# Patient Record
Sex: FEMALE | Race: OTHER | NOT HISPANIC OR LATINO | URBAN - METROPOLITAN AREA
[De-identification: names, ages, dates, MRNs, and addresses within clinical notes are randomized per-mention and may not be internally consistent; named-entity substitution may affect disease eponyms.]

---

## 2021-05-13 ENCOUNTER — EMERGENCY (EMERGENCY)
Facility: HOSPITAL | Age: 46
LOS: 1 days | Discharge: HOME | End: 2021-05-13
Attending: EMERGENCY MEDICINE | Admitting: EMERGENCY MEDICINE
Payer: MEDICAID

## 2021-05-13 VITALS
DIASTOLIC BLOOD PRESSURE: 97 MMHG | HEIGHT: 64 IN | OXYGEN SATURATION: 100 % | RESPIRATION RATE: 20 BRPM | WEIGHT: 175.05 LBS | SYSTOLIC BLOOD PRESSURE: 153 MMHG | TEMPERATURE: 99 F | HEART RATE: 103 BPM

## 2021-05-13 VITALS
DIASTOLIC BLOOD PRESSURE: 69 MMHG | RESPIRATION RATE: 18 BRPM | TEMPERATURE: 98 F | SYSTOLIC BLOOD PRESSURE: 135 MMHG | HEART RATE: 100 BPM | OXYGEN SATURATION: 100 %

## 2021-05-13 DIAGNOSIS — J45.901 UNSPECIFIED ASTHMA WITH (ACUTE) EXACERBATION: ICD-10-CM

## 2021-05-13 DIAGNOSIS — R07.89 OTHER CHEST PAIN: ICD-10-CM

## 2021-05-13 DIAGNOSIS — Z20.822 CONTACT WITH AND (SUSPECTED) EXPOSURE TO COVID-19: ICD-10-CM

## 2021-05-13 DIAGNOSIS — R06.02 SHORTNESS OF BREATH: ICD-10-CM

## 2021-05-13 DIAGNOSIS — E11.9 TYPE 2 DIABETES MELLITUS WITHOUT COMPLICATIONS: ICD-10-CM

## 2021-05-13 LAB
ALBUMIN SERPL ELPH-MCNC: 4.3 G/DL — SIGNIFICANT CHANGE UP (ref 3.3–5)
ALP SERPL-CCNC: 107 U/L — SIGNIFICANT CHANGE UP (ref 40–120)
ALT FLD-CCNC: 10 U/L — SIGNIFICANT CHANGE UP (ref 10–45)
ANION GAP SERPL CALC-SCNC: 15 MMOL/L — SIGNIFICANT CHANGE UP (ref 5–17)
ANISOCYTOSIS BLD QL: SLIGHT — SIGNIFICANT CHANGE UP
APTT BLD: 28.5 SEC — SIGNIFICANT CHANGE UP (ref 27.5–35.5)
AST SERPL-CCNC: 16 U/L — SIGNIFICANT CHANGE UP (ref 10–40)
BASOPHILS # BLD AUTO: 0 K/UL — SIGNIFICANT CHANGE UP (ref 0–0.2)
BASOPHILS NFR BLD AUTO: 0 % — SIGNIFICANT CHANGE UP (ref 0–2)
BILIRUB SERPL-MCNC: 0.2 MG/DL — SIGNIFICANT CHANGE UP (ref 0.2–1.2)
BUN SERPL-MCNC: 11 MG/DL — SIGNIFICANT CHANGE UP (ref 7–23)
CALCIUM SERPL-MCNC: 9.6 MG/DL — SIGNIFICANT CHANGE UP (ref 8.4–10.5)
CHLORIDE SERPL-SCNC: 98 MMOL/L — SIGNIFICANT CHANGE UP (ref 96–108)
CO2 SERPL-SCNC: 21 MMOL/L — LOW (ref 22–31)
CREAT SERPL-MCNC: 0.59 MG/DL — SIGNIFICANT CHANGE UP (ref 0.5–1.3)
D DIMER BLD IA.RAPID-MCNC: 229 NG/ML DDU — SIGNIFICANT CHANGE UP
EOSINOPHIL # BLD AUTO: 0.37 K/UL — SIGNIFICANT CHANGE UP (ref 0–0.5)
EOSINOPHIL NFR BLD AUTO: 3.5 % — SIGNIFICANT CHANGE UP (ref 0–6)
GIANT PLATELETS BLD QL SMEAR: PRESENT — SIGNIFICANT CHANGE UP
GLUCOSE SERPL-MCNC: 213 MG/DL — HIGH (ref 70–99)
HCT VFR BLD CALC: 33.6 % — LOW (ref 34.5–45)
HGB BLD-MCNC: 10.4 G/DL — LOW (ref 11.5–15.5)
INR BLD: 1.02 — SIGNIFICANT CHANGE UP (ref 0.88–1.16)
LYMPHOCYTES # BLD AUTO: 28.7 % — SIGNIFICANT CHANGE UP (ref 13–44)
LYMPHOCYTES # BLD AUTO: 3.01 K/UL — SIGNIFICANT CHANGE UP (ref 1–3.3)
MANUAL SMEAR VERIFICATION: SIGNIFICANT CHANGE UP
MCHC RBC-ENTMCNC: 22.9 PG — LOW (ref 27–34)
MCHC RBC-ENTMCNC: 31 GM/DL — LOW (ref 32–36)
MCV RBC AUTO: 73.8 FL — LOW (ref 80–100)
MICROCYTES BLD QL: SLIGHT — SIGNIFICANT CHANGE UP
MONOCYTES # BLD AUTO: 0.45 K/UL — SIGNIFICANT CHANGE UP (ref 0–0.9)
MONOCYTES NFR BLD AUTO: 4.3 % — SIGNIFICANT CHANGE UP (ref 2–14)
NEUTROPHILS # BLD AUTO: 6.66 K/UL — SIGNIFICANT CHANGE UP (ref 1.8–7.4)
NEUTROPHILS NFR BLD AUTO: 63.5 % — SIGNIFICANT CHANGE UP (ref 43–77)
NT-PROBNP SERPL-SCNC: 17 PG/ML — SIGNIFICANT CHANGE UP (ref 0–300)
OVALOCYTES BLD QL SMEAR: SLIGHT — SIGNIFICANT CHANGE UP
PLAT MORPH BLD: ABNORMAL
PLATELET # BLD AUTO: 429 K/UL — HIGH (ref 150–400)
POLYCHROMASIA BLD QL SMEAR: SLIGHT — SIGNIFICANT CHANGE UP
POTASSIUM SERPL-MCNC: 4.3 MMOL/L — SIGNIFICANT CHANGE UP (ref 3.5–5.3)
POTASSIUM SERPL-SCNC: 4.3 MMOL/L — SIGNIFICANT CHANGE UP (ref 3.5–5.3)
PROT SERPL-MCNC: 8.2 G/DL — SIGNIFICANT CHANGE UP (ref 6–8.3)
PROTHROM AB SERPL-ACNC: 12.2 SEC — SIGNIFICANT CHANGE UP (ref 10.6–13.6)
RBC # BLD: 4.55 M/UL — SIGNIFICANT CHANGE UP (ref 3.8–5.2)
RBC # FLD: 16.9 % — HIGH (ref 10.3–14.5)
RBC BLD AUTO: ABNORMAL
SARS-COV-2 RNA SPEC QL NAA+PROBE: NEGATIVE — SIGNIFICANT CHANGE UP
SARS-COV-2 RNA SPEC QL NAA+PROBE: SIGNIFICANT CHANGE UP
SODIUM SERPL-SCNC: 134 MMOL/L — LOW (ref 135–145)
TROPONIN T SERPL-MCNC: 0.01 NG/ML — SIGNIFICANT CHANGE UP (ref 0–0.01)
WBC # BLD: 10.49 K/UL — SIGNIFICANT CHANGE UP (ref 3.8–10.5)
WBC # FLD AUTO: 10.49 K/UL — SIGNIFICANT CHANGE UP (ref 3.8–10.5)

## 2021-05-13 PROCEDURE — 87635 SARS-COV-2 COVID-19 AMP PRB: CPT

## 2021-05-13 PROCEDURE — U0003: CPT

## 2021-05-13 PROCEDURE — 85730 THROMBOPLASTIN TIME PARTIAL: CPT

## 2021-05-13 PROCEDURE — 94640 AIRWAY INHALATION TREATMENT: CPT

## 2021-05-13 PROCEDURE — 85025 COMPLETE CBC W/AUTO DIFF WBC: CPT

## 2021-05-13 PROCEDURE — 36415 COLL VENOUS BLD VENIPUNCTURE: CPT

## 2021-05-13 PROCEDURE — 85610 PROTHROMBIN TIME: CPT

## 2021-05-13 PROCEDURE — 99284 EMERGENCY DEPT VISIT MOD MDM: CPT | Mod: 25

## 2021-05-13 PROCEDURE — U0005: CPT

## 2021-05-13 PROCEDURE — 84484 ASSAY OF TROPONIN QUANT: CPT

## 2021-05-13 PROCEDURE — 99285 EMERGENCY DEPT VISIT HI MDM: CPT | Mod: 25

## 2021-05-13 PROCEDURE — 80053 COMPREHEN METABOLIC PANEL: CPT

## 2021-05-13 PROCEDURE — 71046 X-RAY EXAM CHEST 2 VIEWS: CPT | Mod: 26

## 2021-05-13 PROCEDURE — 83880 ASSAY OF NATRIURETIC PEPTIDE: CPT

## 2021-05-13 PROCEDURE — 85379 FIBRIN DEGRADATION QUANT: CPT

## 2021-05-13 PROCEDURE — 71046 X-RAY EXAM CHEST 2 VIEWS: CPT

## 2021-05-13 PROCEDURE — 93005 ELECTROCARDIOGRAM TRACING: CPT

## 2021-05-13 RX ORDER — IPRATROPIUM/ALBUTEROL SULFATE 18-103MCG
3 AEROSOL WITH ADAPTER (GRAM) INHALATION ONCE
Refills: 0 | Status: COMPLETED | OUTPATIENT
Start: 2021-05-13 | End: 2021-05-13

## 2021-05-13 RX ORDER — ALBUTEROL 90 UG/1
2 AEROSOL, METERED ORAL
Qty: 1 | Refills: 0
Start: 2021-05-13 | End: 2021-05-19

## 2021-05-13 RX ORDER — AMOXICILLIN 250 MG/5ML
1 SUSPENSION, RECONSTITUTED, ORAL (ML) ORAL
Qty: 21 | Refills: 0
Start: 2021-05-13 | End: 2021-05-19

## 2021-05-13 RX ORDER — IBUPROFEN 200 MG
600 TABLET ORAL ONCE
Refills: 0 | Status: COMPLETED | OUTPATIENT
Start: 2021-05-13 | End: 2021-05-13

## 2021-05-13 RX ADMIN — Medication 60 MILLIGRAM(S): at 21:03

## 2021-05-13 RX ADMIN — Medication 3 MILLILITER(S): at 21:03

## 2021-05-13 RX ADMIN — Medication 600 MILLIGRAM(S): at 21:03

## 2021-05-13 NOTE — ED ADULT NURSE NOTE - OBJECTIVE STATEMENT
45 yo F pmhx Diabetes on metformin and insulin presents to ED co generalized malaise and SOB with exertional CPx3 days, pt had known exposure to COVID19 on Saturday. Pt sent from  for further work up. Pt AOx4, speaking clearly and coherently and ambulates with limp. Chest rise equal and symmetrical, maintaining 02 saturation >94% on RA. NAD.

## 2021-05-13 NOTE — ED PROVIDER NOTE - CLINICAL SUMMARY MEDICAL DECISION MAKING FREE TEXT BOX
45 yo female with 2 weeks of chest congestion, cough, chest pain, headaches.  complicated by recent exposure to covid at a wedding, child sitting at table near her recently had positive test.  pt has not been vaccinated.  exposure was 5 days ago  no fever or hypoxia on vitals.  ekg normal.    will get rapid covid as patient would be candidate possibly for MAB.  labs including trop and d-dimer, cxr  rapid covid negative.  labs normal trop, d-dimer, cxr  given albuterol neb, steroids and pt feeling better  will dc on same, fu pmd

## 2021-05-13 NOTE — ED PROVIDER NOTE - CARE PLAN
Principal Discharge DX:	Moderate asthma with exacerbation, unspecified whether persistent  Secondary Diagnosis:	Chest pain, unspecified type

## 2021-05-13 NOTE — ED PROVIDER NOTE - PATIENT PORTAL LINK FT
You can access the FollowMyHealth Patient Portal offered by [facility] by registering at the following website: [email or web address]. By joining [employer]’s FollowMyHealth portal, you will also be able to view your health information using other applications (apps) compatible with our system.

## 2021-05-13 NOTE — ED ADULT NURSE NOTE - CHIEF COMPLAINT QUOTE
47 y/o female sent from urgent care for evaluation of shortness of breath. Pt reports hx of asthma, used inhaler last night without any relief. Pt endorsed sore throat and headache, did not have covid 19 test.

## 2021-05-13 NOTE — ED ADULT NURSE NOTE - NSIMPLEMENTINTERV_GEN_ALL_ED
Implemented All Universal Safety Interventions:  [place] to call system. Call bell, personal items and telephone within reach. Instruct patient to call for assistance. Room bathroom lighting operational. Non-slip footwear when patient is off stretcher. Physically safe environment: no spills, clutter or unnecessary equipment. Stretcher in lowest position, wheels locked, appropriate side rails in place.

## 2021-05-13 NOTE — ED PROVIDER NOTE - NSFOLLOWUPINSTRUCTIONS_ED_ALL_ED_FT
follow up regular doctor in 2-3 days  your final covid pcr will come back in next 12 hours              Asthma    WHAT YOU NEED TO KNOW:    Asthma is a lung disease that makes breathing difficult. Chronic inflammation and reactions to triggers narrow the airways in the lungs. Asthma can become life-threatening if it is not managed.    Normal vs Asthmatic Bronchioles Adult         DISCHARGE INSTRUCTIONS:    Call your local emergency number (911 in the [place]) if:   •You have severe shortness of breath.      •The skin around your neck and ribs pulls in with each breath.      •Your peak flow numbers are in the red zone of your AAP.      Return to the emergency department if:   •You have shortness of breath, even after you take your short-term medicine as directed.      •Your lips or nails turn blue or gray.      Call your doctor or asthma specialist if:   •You run out of medicine before your next refill is due.      •Your symptoms get worse.      •You need to take more medicine than usual to control your symptoms.      •You have questions or concerns about your condition or care.      Medicines:   •Medicines may be used to decrease inflammation, open airways, and make it easier to breathe. Medicines may be inhaled, taken as a pill, or injected. Short-term medicines relieve your symptoms quickly. Long-term medicines are used to prevent future asthma attacks. Other medicines may be needed if your regular medicines are not able to prevent attacks. You may also need medicine to help control your allergies. Ask your healthcare provider for more information about any medicine you are given and how to take it safely.      •Take your medicine as directed. Contact your healthcare provider if you think your medicine is not helping or if you have side effects. Tell him of her if you are allergic to any medicine. Keep a list of the medicines, vitamins, and herbs you take. Include the amounts, and when and why you take them. Bring the list or the pill bottles to follow-up visits. Carry your medicine list with you in case of an emergency.      Manage your symptoms and prevent future attacks:   •Follow your Asthma Action Plan (AAP). This is a written plan that you and your healthcare provider create. It explains which medicine you need and when to change doses if necessary. It also explains how you can monitor symptoms and use a peak flow meter. The meter measures how well your lungs are working.      •Manage other health conditions, such as allergies, acid reflux, and sleep apnea.      •Identify and avoid triggers. These may include pets, dust mites, mold, and cockroaches.      •Do not smoke or be around others who smoke. Nicotine and other chemicals in cigarettes and cigars can cause lung damage. Ask your healthcare provider for information if you currently smoke and need help to quit. E-cigarettes or smokeless tobacco still contain nicotine. Talk to your healthcare provider before you use these products.      •Ask about the flu vaccine. The flu can make your asthma worse. You may need a yearly flu shot.      Follow up with your healthcare provider as directed: You will need to return to make sure your medicine is working and your symptoms are controlled. You may be referred to an asthma or allergy specialist. You may be asked to keep a record of your peak flow values and bring it with you to your appointments. Write down your questions so you remember to ask them during your visits.       © Copyright [employer] 2021           back to top                          © Copyright [employer] 2021

## 2022-06-08 NOTE — ED ADULT TRIAGE NOTE - CHIEF COMPLAINT QUOTE
45 y/o female sent from urgent care for evaluation of shortness of breath. Pt reports hx of asthma, used inhaler last night without any relief. Pt endorsed sore throat and headache, did not have covid 19 test.
denies pain/discomfort

## 2022-12-06 NOTE — ED PROVIDER NOTE - MUSCULOSKELETAL, MLM
normal (ped)... Spine appears normal, range of motion is not limited, no muscle or joint tenderness, no calf tenderness or swelling

## 2025-08-11 PROBLEM — Z00.00 ENCOUNTER FOR PREVENTIVE HEALTH EXAMINATION: Status: ACTIVE | Noted: 2025-08-11

## 2025-08-26 ENCOUNTER — APPOINTMENT (OUTPATIENT)
Dept: ORTHOPEDIC SURGERY | Facility: CLINIC | Age: 50
End: 2025-08-26

## 2025-09-04 ENCOUNTER — APPOINTMENT (OUTPATIENT)
Dept: ORTHOPEDIC SURGERY | Facility: CLINIC | Age: 50
End: 2025-09-04

## 2025-09-17 ENCOUNTER — APPOINTMENT (OUTPATIENT)
Dept: ORTHOPEDIC SURGERY | Facility: CLINIC | Age: 50
End: 2025-09-17